# Patient Record
Sex: FEMALE | Race: WHITE | NOT HISPANIC OR LATINO | Employment: OTHER | URBAN - METROPOLITAN AREA
[De-identification: names, ages, dates, MRNs, and addresses within clinical notes are randomized per-mention and may not be internally consistent; named-entity substitution may affect disease eponyms.]

---

## 2021-09-22 ENCOUNTER — TELEPHONE (OUTPATIENT)
Dept: CARDIOLOGY | Facility: CLINIC | Age: 76
End: 2021-09-22

## 2021-10-04 ENCOUNTER — OFFICE VISIT (OUTPATIENT)
Dept: CARDIOLOGY | Facility: CLINIC | Age: 76
End: 2021-10-04
Payer: MEDICARE

## 2021-10-04 VITALS
BODY MASS INDEX: 25.58 KG/M2 | WEIGHT: 163 LBS | HEART RATE: 108 BPM | OXYGEN SATURATION: 98 % | RESPIRATION RATE: 16 BRPM | DIASTOLIC BLOOD PRESSURE: 82 MMHG | SYSTOLIC BLOOD PRESSURE: 142 MMHG | HEIGHT: 67 IN

## 2021-10-04 DIAGNOSIS — I10 HTN, GOAL BELOW 130/80: ICD-10-CM

## 2021-10-04 DIAGNOSIS — L40.50 PSORIATIC ARTHRITIS: ICD-10-CM

## 2021-10-04 DIAGNOSIS — Z85.51 HISTORY OF BLADDER CANCER: ICD-10-CM

## 2021-10-04 DIAGNOSIS — Z79.82 ASPIRIN LONG-TERM USE: ICD-10-CM

## 2021-10-04 DIAGNOSIS — I48.0 PAROXYSMAL ATRIAL FIBRILLATION: Primary | ICD-10-CM

## 2021-10-04 DIAGNOSIS — I47.19 AVNRT (AV NODAL RE-ENTRY TACHYCARDIA): ICD-10-CM

## 2021-10-04 DIAGNOSIS — G47.33 OSA (OBSTRUCTIVE SLEEP APNEA): ICD-10-CM

## 2021-10-04 DIAGNOSIS — I10 ESSENTIAL (PRIMARY) HYPERTENSION: ICD-10-CM

## 2021-10-04 PROCEDURE — 93000 EKG 12-LEAD: ICD-10-PCS | Mod: S$GLB,,, | Performed by: GENERAL PRACTICE

## 2021-10-04 PROCEDURE — 1126F AMNT PAIN NOTED NONE PRSNT: CPT | Mod: CPTII,S$GLB,, | Performed by: GENERAL PRACTICE

## 2021-10-04 PROCEDURE — 1126F PR PAIN SEVERITY QUANTIFIED, NO PAIN PRESENT: ICD-10-PCS | Mod: CPTII,S$GLB,, | Performed by: GENERAL PRACTICE

## 2021-10-04 PROCEDURE — 3288F PR FALLS RISK ASSESSMENT DOCUMENTED: ICD-10-PCS | Mod: CPTII,S$GLB,, | Performed by: GENERAL PRACTICE

## 2021-10-04 PROCEDURE — 1101F PT FALLS ASSESS-DOCD LE1/YR: CPT | Mod: CPTII,S$GLB,, | Performed by: GENERAL PRACTICE

## 2021-10-04 PROCEDURE — 1160F PR REVIEW ALL MEDS BY PRESCRIBER/CLIN PHARMACIST DOCUMENTED: ICD-10-PCS | Mod: CPTII,S$GLB,, | Performed by: GENERAL PRACTICE

## 2021-10-04 PROCEDURE — 1159F PR MEDICATION LIST DOCUMENTED IN MEDICAL RECORD: ICD-10-PCS | Mod: CPTII,S$GLB,, | Performed by: GENERAL PRACTICE

## 2021-10-04 PROCEDURE — 1159F MED LIST DOCD IN RCRD: CPT | Mod: CPTII,S$GLB,, | Performed by: GENERAL PRACTICE

## 2021-10-04 PROCEDURE — 3077F SYST BP >= 140 MM HG: CPT | Mod: CPTII,S$GLB,, | Performed by: GENERAL PRACTICE

## 2021-10-04 PROCEDURE — 3079F PR MOST RECENT DIASTOLIC BLOOD PRESSURE 80-89 MM HG: ICD-10-PCS | Mod: CPTII,S$GLB,, | Performed by: GENERAL PRACTICE

## 2021-10-04 PROCEDURE — 3079F DIAST BP 80-89 MM HG: CPT | Mod: CPTII,S$GLB,, | Performed by: GENERAL PRACTICE

## 2021-10-04 PROCEDURE — 1101F PR PT FALLS ASSESS DOC 0-1 FALLS W/OUT INJ PAST YR: ICD-10-PCS | Mod: CPTII,S$GLB,, | Performed by: GENERAL PRACTICE

## 2021-10-04 PROCEDURE — 99205 PR OFFICE/OUTPT VISIT, NEW, LEVL V, 60-74 MIN: ICD-10-PCS | Mod: S$GLB,,, | Performed by: GENERAL PRACTICE

## 2021-10-04 PROCEDURE — 99205 OFFICE O/P NEW HI 60 MIN: CPT | Mod: S$GLB,,, | Performed by: GENERAL PRACTICE

## 2021-10-04 PROCEDURE — 93000 ELECTROCARDIOGRAM COMPLETE: CPT | Mod: S$GLB,,, | Performed by: GENERAL PRACTICE

## 2021-10-04 PROCEDURE — 1160F RVW MEDS BY RX/DR IN RCRD: CPT | Mod: CPTII,S$GLB,, | Performed by: GENERAL PRACTICE

## 2021-10-04 PROCEDURE — 3077F PR MOST RECENT SYSTOLIC BLOOD PRESSURE >= 140 MM HG: ICD-10-PCS | Mod: CPTII,S$GLB,, | Performed by: GENERAL PRACTICE

## 2021-10-04 PROCEDURE — 3288F FALL RISK ASSESSMENT DOCD: CPT | Mod: CPTII,S$GLB,, | Performed by: GENERAL PRACTICE

## 2021-10-04 RX ORDER — LOSARTAN POTASSIUM 25 MG/1
25 TABLET ORAL DAILY
Qty: 30 TABLET | Refills: 3 | Status: SHIPPED | OUTPATIENT
Start: 2021-10-04

## 2021-10-04 RX ORDER — METOPROLOL TARTRATE 25 MG/1
50 TABLET, FILM COATED ORAL 2 TIMES DAILY
Qty: 60 TABLET | Refills: 3 | Status: SHIPPED | OUTPATIENT
Start: 2021-10-04 | End: 2021-10-18 | Stop reason: SDUPTHER

## 2021-10-04 RX ORDER — FUROSEMIDE 20 MG/1
20 TABLET ORAL 2 TIMES DAILY
Qty: 30 TABLET | Refills: 1 | Status: SHIPPED | OUTPATIENT
Start: 2021-10-04 | End: 2021-10-18 | Stop reason: SDUPTHER

## 2021-10-04 RX ORDER — LOSARTAN POTASSIUM 25 MG/1
25 TABLET ORAL DAILY
Qty: 30 TABLET | Refills: 30 | Status: SHIPPED | OUTPATIENT
Start: 2021-10-04 | End: 2021-10-04 | Stop reason: SDUPTHER

## 2021-10-04 RX ORDER — METOPROLOL TARTRATE 25 MG/1
50 TABLET, FILM COATED ORAL 2 TIMES DAILY
Qty: 60 TABLET | Refills: 3 | Status: SHIPPED | OUTPATIENT
Start: 2021-10-04 | End: 2021-10-04 | Stop reason: SDUPTHER

## 2021-10-04 NOTE — TELEPHONE ENCOUNTER
----- Message from Santiago Ngozi sent at 10/4/2021  3:24 PM CDT -----  Type:  Pharmacy Calling to Clarify an RX    Name of Caller:   Pharmacy Name:[facility] [address], [place] - [address] (Ph: [phone])  Prescription Name: losartan (COZAAR) 25 MG tablet  metoprolol tartrate (LOPRESSOR) 25 MG tablet  What do they need to clarify?: losartan-refill, met-quantity and direction  Best Call Back Number:  Additional Information: please call back and clarify

## 2021-10-07 ENCOUNTER — HOSPITAL ENCOUNTER (OUTPATIENT)
Dept: RADIOLOGY | Facility: HOSPITAL | Age: 76
Discharge: HOME | End: 2021-10-07
Attending: GENERAL PRACTICE
Payer: MEDICARE

## 2021-10-07 DIAGNOSIS — I47.19 AVNRT (AV NODAL RE-ENTRY TACHYCARDIA): ICD-10-CM

## 2021-10-07 DIAGNOSIS — G47.33 OSA (OBSTRUCTIVE SLEEP APNEA): ICD-10-CM

## 2021-10-07 DIAGNOSIS — I10 ESSENTIAL (PRIMARY) HYPERTENSION: ICD-10-CM

## 2021-10-07 DIAGNOSIS — Z85.51 HISTORY OF BLADDER CANCER: ICD-10-CM

## 2021-10-07 DIAGNOSIS — I48.0 PAROXYSMAL ATRIAL FIBRILLATION: ICD-10-CM

## 2021-10-07 DIAGNOSIS — I10 HTN, GOAL BELOW 130/80: ICD-10-CM

## 2021-10-07 DIAGNOSIS — L40.50 PSORIATIC ARTHRITIS: ICD-10-CM

## 2021-10-07 DIAGNOSIS — Z79.82 ASPIRIN LONG-TERM USE: ICD-10-CM

## 2021-10-07 PROCEDURE — 71046 X-RAY EXAM CHEST 2 VIEWS: CPT | Mod: TC,PO

## 2021-10-12 ENCOUNTER — HOSPITAL ENCOUNTER (OUTPATIENT)
Dept: CARDIOLOGY | Facility: CLINIC | Age: 76
Discharge: HOME | End: 2021-10-12
Attending: GENERAL PRACTICE
Payer: MEDICARE

## 2021-10-12 ENCOUNTER — HOSPITAL ENCOUNTER (INPATIENT)
Facility: HOSPITAL | Age: 76
LOS: 1 days | Discharge: LEFT BEFORE DISCHARGE | DRG: 310 | End: 2021-10-13
Attending: EMERGENCY MEDICINE | Admitting: INTERNAL MEDICINE
Payer: MEDICARE

## 2021-10-12 VITALS — BODY MASS INDEX: 25.58 KG/M2 | WEIGHT: 163 LBS | HEIGHT: 67 IN

## 2021-10-12 DIAGNOSIS — R00.0 TACHYCARDIA: ICD-10-CM

## 2021-10-12 DIAGNOSIS — I48.0 PAROXYSMAL ATRIAL FIBRILLATION: ICD-10-CM

## 2021-10-12 DIAGNOSIS — I48.91 ATRIAL FIBRILLATION: ICD-10-CM

## 2021-10-12 DIAGNOSIS — J81.0 ACUTE PULMONARY EDEMA: ICD-10-CM

## 2021-10-12 DIAGNOSIS — I48.91 ATRIAL FIBRILLATION WITH RVR: Primary | ICD-10-CM

## 2021-10-12 PROBLEM — I50.9 ACUTE CHF: Status: ACTIVE | Noted: 2021-10-12

## 2021-10-12 LAB
ALBUMIN SERPL BCP-MCNC: 4 G/DL (ref 3.5–5.2)
ALP SERPL-CCNC: 65 U/L (ref 55–135)
ALT SERPL W/O P-5'-P-CCNC: 27 U/L (ref 10–44)
ANION GAP SERPL CALC-SCNC: 11 MMOL/L (ref 8–16)
AORTIC ROOT ANNULUS: 3 CM
AORTIC VALVE CUSP SEPERATION: 1.1 CM
APTT PPP: 32.5 SEC (ref 23.3–35.1)
AST SERPL-CCNC: 28 U/L (ref 10–40)
AV INDEX (PROSTH): 0.6
AV MEAN GRADIENT: 4 MMHG
AV PEAK GRADIENT: 6 MMHG
AV VALVE AREA: 1.71 CM2
AV VELOCITY RATIO: 0.61
BASOPHILS # BLD AUTO: 0.07 K/UL (ref 0–0.2)
BASOPHILS NFR BLD: 0.9 % (ref 0–1.9)
BILIRUB SERPL-MCNC: 2.7 MG/DL (ref 0.1–1)
BILIRUB UR QL STRIP: NEGATIVE
BNP SERPL-MCNC: 1605 PG/ML (ref 0–99)
BSA FOR ECHO PROCEDURE: 1.87 M2
BUN SERPL-MCNC: 15 MG/DL (ref 8–23)
CALCIUM SERPL-MCNC: 9.2 MG/DL (ref 8.7–10.5)
CHLORIDE SERPL-SCNC: 104 MMOL/L (ref 95–110)
CK MB SERPL-MCNC: 4.1 NG/ML (ref 0.1–6.5)
CK SERPL-CCNC: 70 U/L (ref 20–180)
CLARITY UR: CLEAR
CO2 SERPL-SCNC: 22 MMOL/L (ref 23–29)
COLOR UR: COLORLESS
CREAT SERPL-MCNC: 1 MG/DL (ref 0.5–1.4)
CV ECHO LV RWT: 0.49 CM
DIFFERENTIAL METHOD: ABNORMAL
DOP CALC AO PEAK VEL: 1.21 M/S
DOP CALC AO VTI: 18.6 CM
DOP CALC LVOT AREA: 2.8 CM2
DOP CALC LVOT DIAMETER: 1.9 CM
DOP CALC LVOT PEAK VEL: 0.74 M/S
DOP CALC LVOT STROKE VOLUME: 31.74 CM3
DOP CALCLVOT PEAK VEL VTI: 11.2 CM
E WAVE DECELERATION TIME: 137 MS
E/E' RATIO: 17.09 M/S
ECHO LV POSTERIOR WALL: 1.2 CM (ref 0.6–1.1)
EJECTION FRACTION: 50 %
EOSINOPHIL # BLD AUTO: 0.1 K/UL (ref 0–0.5)
EOSINOPHIL NFR BLD: 0.8 % (ref 0–8)
ERYTHROCYTE [DISTWIDTH] IN BLOOD BY AUTOMATED COUNT: 14.8 % (ref 11.5–14.5)
EST. GFR  (AFRICAN AMERICAN): >60 ML/MIN/1.73 M^2
EST. GFR  (NON AFRICAN AMERICAN): 55.2 ML/MIN/1.73 M^2
FRACTIONAL SHORTENING: 9 % (ref 28–44)
GLUCOSE SERPL-MCNC: 155 MG/DL (ref 70–110)
GLUCOSE UR QL STRIP: NEGATIVE
HCT VFR BLD AUTO: 43.9 % (ref 37–48.5)
HGB BLD-MCNC: 14.1 G/DL (ref 12–16)
HGB UR QL STRIP: NEGATIVE
IMM GRANULOCYTES # BLD AUTO: 0.02 K/UL (ref 0–0.04)
IMM GRANULOCYTES NFR BLD AUTO: 0.3 % (ref 0–0.5)
INR PPP: 1.5
INTERVENTRICULAR SEPTUM: 1.34 CM (ref 0.6–1.1)
IVRT: 58 MS
KETONES UR QL STRIP: NEGATIVE
LA MAJOR: 4.8 CM
LEFT ATRIUM SIZE: 4.8 CM
LEFT INTERNAL DIMENSION IN SYSTOLE: 4.43 CM (ref 2.1–4)
LEFT VENTRICLE MASS INDEX: 131 G/M2
LEFT VENTRICULAR INTERNAL DIMENSION IN DIASTOLE: 4.85 CM (ref 3.5–6)
LEFT VENTRICULAR MASS: 241.46 G
LEUKOCYTE ESTERASE UR QL STRIP: NEGATIVE
LV LATERAL E/E' RATIO: 11.75 M/S
LV SEPTAL E/E' RATIO: 31.33 M/S
LYMPHOCYTES # BLD AUTO: 1.3 K/UL (ref 1–4.8)
LYMPHOCYTES NFR BLD: 18 % (ref 18–48)
MAGNESIUM SERPL-MCNC: 2.1 MG/DL (ref 1.6–2.6)
MCH RBC QN AUTO: 30.7 PG (ref 27–31)
MCHC RBC AUTO-ENTMCNC: 32.1 G/DL (ref 32–36)
MCV RBC AUTO: 95 FL (ref 82–98)
MONOCYTES # BLD AUTO: 0.6 K/UL (ref 0.3–1)
MONOCYTES NFR BLD: 7.8 % (ref 4–15)
MV PEAK E VEL: 0.94 M/S
NEUTROPHILS # BLD AUTO: 5.4 K/UL (ref 1.8–7.7)
NEUTROPHILS NFR BLD: 72.2 % (ref 38–73)
NITRITE UR QL STRIP: NEGATIVE
NRBC BLD-RTO: 0 /100 WBC
PH UR STRIP: 6 [PH] (ref 5–8)
PISA TR MAX VEL: 2.99 M/S
PLATELET # BLD AUTO: 180 K/UL (ref 150–450)
PMV BLD AUTO: 10.3 FL (ref 9.2–12.9)
POTASSIUM SERPL-SCNC: 3.5 MMOL/L (ref 3.5–5.1)
PROT SERPL-MCNC: 6.9 G/DL (ref 6–8.4)
PROT UR QL STRIP: NEGATIVE
PROTHROMBIN TIME: 17.2 SEC (ref 11.4–13.7)
RA PRESSURE: 3 MMHG
RBC # BLD AUTO: 4.6 M/UL (ref 4–5.4)
RIGHT VENTRICULAR END-DIASTOLIC DIMENSION: 3.25 CM
SARS-COV-2 RDRP RESP QL NAA+PROBE: NEGATIVE
SODIUM SERPL-SCNC: 137 MMOL/L (ref 136–145)
SP GR UR STRIP: 1 (ref 1–1.03)
TDI LATERAL: 0.08 M/S
TDI SEPTAL: 0.03 M/S
TDI: 0.06 M/S
TR MAX PG: 36 MMHG
TROPONIN I SERPL DL<=0.01 NG/ML-MCNC: 0.04 NG/ML
TV REST PULMONARY ARTERY PRESSURE: 39 MMHG
URN SPEC COLLECT METH UR: ABNORMAL
UROBILINOGEN UR STRIP-ACNC: NEGATIVE EU/DL
WBC # BLD AUTO: 7.45 K/UL (ref 3.9–12.7)

## 2021-10-12 PROCEDURE — 93005 ELECTROCARDIOGRAM TRACING: CPT | Performed by: INTERNAL MEDICINE

## 2021-10-12 PROCEDURE — 83735 ASSAY OF MAGNESIUM: CPT | Performed by: EMERGENCY MEDICINE

## 2021-10-12 PROCEDURE — 99291 CRITICAL CARE FIRST HOUR: CPT

## 2021-10-12 PROCEDURE — 94761 N-INVAS EAR/PLS OXIMETRY MLT: CPT

## 2021-10-12 PROCEDURE — 81003 URINALYSIS AUTO W/O SCOPE: CPT | Performed by: EMERGENCY MEDICINE

## 2021-10-12 PROCEDURE — 85025 COMPLETE CBC W/AUTO DIFF WBC: CPT | Performed by: EMERGENCY MEDICINE

## 2021-10-12 PROCEDURE — 99900035 HC TECH TIME PER 15 MIN (STAT)

## 2021-10-12 PROCEDURE — 36415 COLL VENOUS BLD VENIPUNCTURE: CPT | Performed by: EMERGENCY MEDICINE

## 2021-10-12 PROCEDURE — 82553 CREATINE MB FRACTION: CPT | Performed by: EMERGENCY MEDICINE

## 2021-10-12 PROCEDURE — 83880 ASSAY OF NATRIURETIC PEPTIDE: CPT | Performed by: EMERGENCY MEDICINE

## 2021-10-12 PROCEDURE — 96366 THER/PROPH/DIAG IV INF ADDON: CPT

## 2021-10-12 PROCEDURE — 93010 EKG 12-LEAD: ICD-10-PCS | Mod: ,,, | Performed by: INTERNAL MEDICINE

## 2021-10-12 PROCEDURE — 82550 ASSAY OF CK (CPK): CPT | Performed by: EMERGENCY MEDICINE

## 2021-10-12 PROCEDURE — U0002 COVID-19 LAB TEST NON-CDC: HCPCS | Performed by: EMERGENCY MEDICINE

## 2021-10-12 PROCEDURE — 84484 ASSAY OF TROPONIN QUANT: CPT | Performed by: EMERGENCY MEDICINE

## 2021-10-12 PROCEDURE — 85730 THROMBOPLASTIN TIME PARTIAL: CPT | Performed by: EMERGENCY MEDICINE

## 2021-10-12 PROCEDURE — 21000000 HC CCU ICU ROOM CHARGE

## 2021-10-12 PROCEDURE — 96375 TX/PRO/DX INJ NEW DRUG ADDON: CPT

## 2021-10-12 PROCEDURE — 63600175 PHARM REV CODE 636 W HCPCS: Performed by: EMERGENCY MEDICINE

## 2021-10-12 PROCEDURE — 25000003 PHARM REV CODE 250

## 2021-10-12 PROCEDURE — 25000003 PHARM REV CODE 250: Performed by: EMERGENCY MEDICINE

## 2021-10-12 PROCEDURE — 80053 COMPREHEN METABOLIC PANEL: CPT | Performed by: EMERGENCY MEDICINE

## 2021-10-12 PROCEDURE — 96365 THER/PROPH/DIAG IV INF INIT: CPT

## 2021-10-12 PROCEDURE — 25000003 PHARM REV CODE 250: Performed by: INTERNAL MEDICINE

## 2021-10-12 PROCEDURE — 85610 PROTHROMBIN TIME: CPT | Performed by: EMERGENCY MEDICINE

## 2021-10-12 PROCEDURE — 93306 TTE W/DOPPLER COMPLETE: CPT | Mod: S$GLB,,, | Performed by: GENERAL PRACTICE

## 2021-10-12 PROCEDURE — 93010 ELECTROCARDIOGRAM REPORT: CPT | Mod: ,,, | Performed by: INTERNAL MEDICINE

## 2021-10-12 PROCEDURE — 93306 ECHO (CUPID ONLY): ICD-10-PCS | Mod: S$GLB,,, | Performed by: GENERAL PRACTICE

## 2021-10-12 RX ORDER — FUROSEMIDE 10 MG/ML
40 INJECTION INTRAMUSCULAR; INTRAVENOUS
Status: DISCONTINUED | OUTPATIENT
Start: 2021-10-13 | End: 2021-10-13 | Stop reason: HOSPADM

## 2021-10-12 RX ORDER — MAGNESIUM SULFATE HEPTAHYDRATE 40 MG/ML
4 INJECTION, SOLUTION INTRAVENOUS
Status: DISCONTINUED | OUTPATIENT
Start: 2021-10-12 | End: 2021-10-13 | Stop reason: HOSPADM

## 2021-10-12 RX ORDER — POTASSIUM CHLORIDE 20 MEQ/1
40 TABLET, EXTENDED RELEASE ORAL ONCE
Status: COMPLETED | OUTPATIENT
Start: 2021-10-12 | End: 2021-10-12

## 2021-10-12 RX ORDER — POTASSIUM CHLORIDE 7.45 MG/ML
40 INJECTION INTRAVENOUS
Status: DISCONTINUED | OUTPATIENT
Start: 2021-10-12 | End: 2021-10-13 | Stop reason: HOSPADM

## 2021-10-12 RX ORDER — POTASSIUM CHLORIDE 20 MEQ/1
20 TABLET, EXTENDED RELEASE ORAL
Status: DISCONTINUED | OUTPATIENT
Start: 2021-10-12 | End: 2021-10-13 | Stop reason: HOSPADM

## 2021-10-12 RX ORDER — MAGNESIUM SULFATE HEPTAHYDRATE 40 MG/ML
2 INJECTION, SOLUTION INTRAVENOUS
Status: DISCONTINUED | OUTPATIENT
Start: 2021-10-12 | End: 2021-10-13 | Stop reason: HOSPADM

## 2021-10-12 RX ORDER — POTASSIUM CHLORIDE 20 MEQ/1
40 TABLET, EXTENDED RELEASE ORAL
Status: DISCONTINUED | OUTPATIENT
Start: 2021-10-12 | End: 2021-10-13 | Stop reason: HOSPADM

## 2021-10-12 RX ORDER — DILTIAZEM HYDROCHLORIDE 5 MG/ML
INJECTION INTRAVENOUS
Status: DISPENSED
Start: 2021-10-12 | End: 2021-10-13

## 2021-10-12 RX ORDER — FUROSEMIDE 10 MG/ML
20 INJECTION INTRAMUSCULAR; INTRAVENOUS
Status: COMPLETED | OUTPATIENT
Start: 2021-10-12 | End: 2021-10-12

## 2021-10-12 RX ORDER — POTASSIUM CHLORIDE 7.45 MG/ML
20 INJECTION INTRAVENOUS
Status: DISCONTINUED | OUTPATIENT
Start: 2021-10-12 | End: 2021-10-13 | Stop reason: HOSPADM

## 2021-10-12 RX ORDER — LANOLIN ALCOHOL/MO/W.PET/CERES
800 CREAM (GRAM) TOPICAL
Status: DISCONTINUED | OUTPATIENT
Start: 2021-10-12 | End: 2021-10-13 | Stop reason: HOSPADM

## 2021-10-12 RX ORDER — FUROSEMIDE 10 MG/ML
40 INJECTION INTRAMUSCULAR; INTRAVENOUS
Status: DISCONTINUED | OUTPATIENT
Start: 2021-10-13 | End: 2021-10-12

## 2021-10-12 RX ORDER — DILTIAZEM HYDROCHLORIDE 5 MG/ML
10 INJECTION INTRAVENOUS
Status: COMPLETED | OUTPATIENT
Start: 2021-10-12 | End: 2021-10-12

## 2021-10-12 RX ORDER — MAGNESIUM SULFATE 1 G/100ML
1 INJECTION INTRAVENOUS
Status: DISCONTINUED | OUTPATIENT
Start: 2021-10-12 | End: 2021-10-13 | Stop reason: HOSPADM

## 2021-10-12 RX ORDER — ASPIRIN 325 MG
650 TABLET ORAL DAILY
COMMUNITY

## 2021-10-12 RX ORDER — FUROSEMIDE 10 MG/ML
40 INJECTION INTRAMUSCULAR; INTRAVENOUS DAILY
Status: DISCONTINUED | OUTPATIENT
Start: 2021-10-12 | End: 2021-10-12

## 2021-10-12 RX ORDER — PANTOPRAZOLE SODIUM 40 MG/1
40 TABLET, DELAYED RELEASE ORAL DAILY
Status: DISCONTINUED | OUTPATIENT
Start: 2021-10-12 | End: 2021-10-13 | Stop reason: HOSPADM

## 2021-10-12 RX ORDER — ACETAMINOPHEN 325 MG/1
650 TABLET ORAL EVERY 4 HOURS PRN
Status: DISCONTINUED | OUTPATIENT
Start: 2021-10-12 | End: 2021-10-13 | Stop reason: HOSPADM

## 2021-10-12 RX ORDER — NALOXONE HCL 0.4 MG/ML
0.02 VIAL (ML) INJECTION
Status: DISCONTINUED | OUTPATIENT
Start: 2021-10-12 | End: 2021-10-13 | Stop reason: HOSPADM

## 2021-10-12 RX ORDER — METOPROLOL TARTRATE 50 MG/1
50 TABLET ORAL 2 TIMES DAILY
Status: DISCONTINUED | OUTPATIENT
Start: 2021-10-12 | End: 2021-10-13 | Stop reason: HOSPADM

## 2021-10-12 RX ADMIN — APIXABAN 5 MG: 5 TABLET, FILM COATED ORAL at 08:10

## 2021-10-12 RX ADMIN — FUROSEMIDE 20 MG: 10 INJECTION, SOLUTION INTRAMUSCULAR; INTRAVENOUS at 02:10

## 2021-10-12 RX ADMIN — DEXTROSE MONOHYDRATE 5 MG/HR: 50 INJECTION, SOLUTION INTRAVENOUS at 12:10

## 2021-10-12 RX ADMIN — DILTIAZEM HYDROCHLORIDE 10 MG: 5 INJECTION INTRAVENOUS at 12:10

## 2021-10-12 RX ADMIN — POTASSIUM CHLORIDE 40 MEQ: 20 TABLET, EXTENDED RELEASE ORAL at 04:10

## 2021-10-12 RX ADMIN — METOPROLOL TARTRATE 50 MG: 50 TABLET, FILM COATED ORAL at 08:10

## 2021-10-13 VITALS
BODY MASS INDEX: 30.22 KG/M2 | RESPIRATION RATE: 22 BRPM | DIASTOLIC BLOOD PRESSURE: 83 MMHG | SYSTOLIC BLOOD PRESSURE: 137 MMHG | HEART RATE: 77 BPM | WEIGHT: 164.25 LBS | HEIGHT: 62 IN | TEMPERATURE: 97 F | OXYGEN SATURATION: 92 %

## 2021-10-13 LAB
ALBUMIN SERPL BCP-MCNC: 3.6 G/DL (ref 3.5–5.2)
ALP SERPL-CCNC: 60 U/L (ref 55–135)
ALT SERPL W/O P-5'-P-CCNC: 24 U/L (ref 10–44)
ANION GAP SERPL CALC-SCNC: 10 MMOL/L (ref 8–16)
AST SERPL-CCNC: 26 U/L (ref 10–40)
BILIRUB SERPL-MCNC: 2.8 MG/DL (ref 0.1–1)
BUN SERPL-MCNC: 17 MG/DL (ref 8–23)
CALCIUM SERPL-MCNC: 9 MG/DL (ref 8.7–10.5)
CHLORIDE SERPL-SCNC: 107 MMOL/L (ref 95–110)
CO2 SERPL-SCNC: 24 MMOL/L (ref 23–29)
CREAT SERPL-MCNC: 1 MG/DL (ref 0.5–1.4)
ERYTHROCYTE [DISTWIDTH] IN BLOOD BY AUTOMATED COUNT: 14.8 % (ref 11.5–14.5)
EST. GFR  (AFRICAN AMERICAN): >60 ML/MIN/1.73 M^2
EST. GFR  (NON AFRICAN AMERICAN): 55.2 ML/MIN/1.73 M^2
GLUCOSE SERPL-MCNC: 131 MG/DL (ref 70–110)
HCT VFR BLD AUTO: 42.3 % (ref 37–48.5)
HGB BLD-MCNC: 13.3 G/DL (ref 12–16)
MAGNESIUM SERPL-MCNC: 2 MG/DL (ref 1.6–2.6)
MCH RBC QN AUTO: 30.7 PG (ref 27–31)
MCHC RBC AUTO-ENTMCNC: 31.4 G/DL (ref 32–36)
MCV RBC AUTO: 98 FL (ref 82–98)
PLATELET # BLD AUTO: 169 K/UL (ref 150–450)
PMV BLD AUTO: 10.3 FL (ref 9.2–12.9)
POTASSIUM SERPL-SCNC: 3.7 MMOL/L (ref 3.5–5.1)
PROT SERPL-MCNC: 6.3 G/DL (ref 6–8.4)
RBC # BLD AUTO: 4.33 M/UL (ref 4–5.4)
SODIUM SERPL-SCNC: 141 MMOL/L (ref 136–145)
WBC # BLD AUTO: 6.95 K/UL (ref 3.9–12.7)

## 2021-10-13 PROCEDURE — 80053 COMPREHEN METABOLIC PANEL: CPT | Performed by: INTERNAL MEDICINE

## 2021-10-13 PROCEDURE — 99900035 HC TECH TIME PER 15 MIN (STAT)

## 2021-10-13 PROCEDURE — 85027 COMPLETE CBC AUTOMATED: CPT | Performed by: INTERNAL MEDICINE

## 2021-10-13 PROCEDURE — 94761 N-INVAS EAR/PLS OXIMETRY MLT: CPT

## 2021-10-13 PROCEDURE — 36415 COLL VENOUS BLD VENIPUNCTURE: CPT | Performed by: INTERNAL MEDICINE

## 2021-10-13 PROCEDURE — 83735 ASSAY OF MAGNESIUM: CPT | Performed by: INTERNAL MEDICINE

## 2021-10-13 PROCEDURE — 25000003 PHARM REV CODE 250: Performed by: INTERNAL MEDICINE

## 2021-10-13 PROCEDURE — 63600175 PHARM REV CODE 636 W HCPCS: Performed by: INTERNAL MEDICINE

## 2021-10-13 RX ORDER — DILTIAZEM HYDROCHLORIDE 120 MG/1
120 CAPSULE, COATED, EXTENDED RELEASE ORAL DAILY
Status: DISCONTINUED | OUTPATIENT
Start: 2021-10-13 | End: 2021-10-13

## 2021-10-13 RX ORDER — DILTIAZEM HYDROCHLORIDE 60 MG/1
60 TABLET, FILM COATED ORAL EVERY 8 HOURS
Status: DISCONTINUED | OUTPATIENT
Start: 2021-10-13 | End: 2021-10-13

## 2021-10-13 RX ORDER — DILTIAZEM HYDROCHLORIDE 120 MG/1
120 CAPSULE, COATED, EXTENDED RELEASE ORAL DAILY
Status: DISCONTINUED | OUTPATIENT
Start: 2021-10-13 | End: 2021-10-13 | Stop reason: HOSPADM

## 2021-10-13 RX ADMIN — PANTOPRAZOLE SODIUM 40 MG: 40 TABLET, DELAYED RELEASE ORAL at 05:10

## 2021-10-13 RX ADMIN — FUROSEMIDE 40 MG: 10 INJECTION, SOLUTION INTRAMUSCULAR; INTRAVENOUS at 05:10

## 2021-10-13 RX ADMIN — DEXTROSE MONOHYDRATE 5 MG/HR: 50 INJECTION, SOLUTION INTRAVENOUS at 06:10

## 2021-10-18 ENCOUNTER — OFFICE VISIT (OUTPATIENT)
Dept: CARDIOLOGY | Facility: CLINIC | Age: 76
End: 2021-10-18
Payer: MEDICARE

## 2021-10-18 VITALS
DIASTOLIC BLOOD PRESSURE: 102 MMHG | WEIGHT: 160 LBS | OXYGEN SATURATION: 97 % | HEIGHT: 62 IN | SYSTOLIC BLOOD PRESSURE: 154 MMHG | HEART RATE: 86 BPM | BODY MASS INDEX: 29.44 KG/M2

## 2021-10-18 DIAGNOSIS — R73.09 ABNORMAL GLUCOSE: ICD-10-CM

## 2021-10-18 DIAGNOSIS — I47.19 AVNRT (AV NODAL RE-ENTRY TACHYCARDIA): ICD-10-CM

## 2021-10-18 DIAGNOSIS — I50.41 ACUTE COMBINED SYSTOLIC AND DIASTOLIC CONGESTIVE HEART FAILURE: ICD-10-CM

## 2021-10-18 DIAGNOSIS — Z85.51 HISTORY OF BLADDER CANCER: ICD-10-CM

## 2021-10-18 DIAGNOSIS — G47.33 OSA (OBSTRUCTIVE SLEEP APNEA): ICD-10-CM

## 2021-10-18 DIAGNOSIS — I10 ESSENTIAL (PRIMARY) HYPERTENSION: ICD-10-CM

## 2021-10-18 DIAGNOSIS — I48.91 ATRIAL FIBRILLATION WITH RVR: Primary | ICD-10-CM

## 2021-10-18 DIAGNOSIS — I10 HTN, GOAL BELOW 130/80: ICD-10-CM

## 2021-10-18 DIAGNOSIS — N18.31 STAGE 3A CHRONIC KIDNEY DISEASE: ICD-10-CM

## 2021-10-18 DIAGNOSIS — I21.09 ACUTE MYOCARDIAL INFARCTION OF ANTEROLATERAL WALL: ICD-10-CM

## 2021-10-18 DIAGNOSIS — Z79.82 ASPIRIN LONG-TERM USE: ICD-10-CM

## 2021-10-18 PROCEDURE — 1101F PT FALLS ASSESS-DOCD LE1/YR: CPT | Mod: CPTII,S$GLB,, | Performed by: GENERAL PRACTICE

## 2021-10-18 PROCEDURE — 99214 PR OFFICE/OUTPT VISIT, EST, LEVL IV, 30-39 MIN: ICD-10-PCS | Mod: S$GLB,,, | Performed by: GENERAL PRACTICE

## 2021-10-18 PROCEDURE — 1159F PR MEDICATION LIST DOCUMENTED IN MEDICAL RECORD: ICD-10-PCS | Mod: CPTII,S$GLB,, | Performed by: GENERAL PRACTICE

## 2021-10-18 PROCEDURE — 1126F AMNT PAIN NOTED NONE PRSNT: CPT | Mod: CPTII,S$GLB,, | Performed by: GENERAL PRACTICE

## 2021-10-18 PROCEDURE — 93000 ELECTROCARDIOGRAM COMPLETE: CPT | Mod: S$GLB,,, | Performed by: GENERAL PRACTICE

## 2021-10-18 PROCEDURE — 1101F PR PT FALLS ASSESS DOC 0-1 FALLS W/OUT INJ PAST YR: ICD-10-PCS | Mod: CPTII,S$GLB,, | Performed by: GENERAL PRACTICE

## 2021-10-18 PROCEDURE — 1111F DSCHRG MED/CURRENT MED MERGE: CPT | Mod: CPTII,S$GLB,, | Performed by: GENERAL PRACTICE

## 2021-10-18 PROCEDURE — 3080F DIAST BP >= 90 MM HG: CPT | Mod: CPTII,S$GLB,, | Performed by: GENERAL PRACTICE

## 2021-10-18 PROCEDURE — 3077F SYST BP >= 140 MM HG: CPT | Mod: CPTII,S$GLB,, | Performed by: GENERAL PRACTICE

## 2021-10-18 PROCEDURE — 1111F PR DISCHARGE MEDS RECONCILED W/ CURRENT OUTPATIENT MED LIST: ICD-10-PCS | Mod: CPTII,S$GLB,, | Performed by: GENERAL PRACTICE

## 2021-10-18 PROCEDURE — 3077F PR MOST RECENT SYSTOLIC BLOOD PRESSURE >= 140 MM HG: ICD-10-PCS | Mod: CPTII,S$GLB,, | Performed by: GENERAL PRACTICE

## 2021-10-18 PROCEDURE — 93000 EKG 12-LEAD: ICD-10-PCS | Mod: S$GLB,,, | Performed by: GENERAL PRACTICE

## 2021-10-18 PROCEDURE — 1126F PR PAIN SEVERITY QUANTIFIED, NO PAIN PRESENT: ICD-10-PCS | Mod: CPTII,S$GLB,, | Performed by: GENERAL PRACTICE

## 2021-10-18 PROCEDURE — 1160F RVW MEDS BY RX/DR IN RCRD: CPT | Mod: CPTII,S$GLB,, | Performed by: GENERAL PRACTICE

## 2021-10-18 PROCEDURE — 1159F MED LIST DOCD IN RCRD: CPT | Mod: CPTII,S$GLB,, | Performed by: GENERAL PRACTICE

## 2021-10-18 PROCEDURE — 3288F PR FALLS RISK ASSESSMENT DOCUMENTED: ICD-10-PCS | Mod: CPTII,S$GLB,, | Performed by: GENERAL PRACTICE

## 2021-10-18 PROCEDURE — 3288F FALL RISK ASSESSMENT DOCD: CPT | Mod: CPTII,S$GLB,, | Performed by: GENERAL PRACTICE

## 2021-10-18 PROCEDURE — 1160F PR REVIEW ALL MEDS BY PRESCRIBER/CLIN PHARMACIST DOCUMENTED: ICD-10-PCS | Mod: CPTII,S$GLB,, | Performed by: GENERAL PRACTICE

## 2021-10-18 PROCEDURE — 3080F PR MOST RECENT DIASTOLIC BLOOD PRESSURE >= 90 MM HG: ICD-10-PCS | Mod: CPTII,S$GLB,, | Performed by: GENERAL PRACTICE

## 2021-10-18 PROCEDURE — 99214 OFFICE O/P EST MOD 30 MIN: CPT | Mod: S$GLB,,, | Performed by: GENERAL PRACTICE

## 2021-10-18 RX ORDER — METOPROLOL TARTRATE 50 MG/1
50 TABLET ORAL 2 TIMES DAILY
Qty: 180 TABLET | Refills: 3 | Status: SHIPPED | OUTPATIENT
Start: 2021-10-18

## 2021-10-18 RX ORDER — DILTIAZEM HYDROCHLORIDE 120 MG/1
120 CAPSULE, EXTENDED RELEASE ORAL DAILY
Qty: 90 CAPSULE | Refills: 3 | Status: SHIPPED | OUTPATIENT
Start: 2021-10-18 | End: 2021-10-19

## 2021-10-18 RX ORDER — FUROSEMIDE 20 MG/1
20 TABLET ORAL DAILY
Qty: 90 TABLET | Refills: 3 | Status: SHIPPED | OUTPATIENT
Start: 2021-10-18 | End: 2022-03-21 | Stop reason: SDUPTHER

## 2021-12-06 ENCOUNTER — TELEPHONE (OUTPATIENT)
Dept: CARDIOLOGY | Facility: CLINIC | Age: 76
End: 2021-12-06
Payer: MEDICARE

## 2021-12-06 ENCOUNTER — TELEPHONE (OUTPATIENT)
Dept: FAMILY MEDICINE | Facility: CLINIC | Age: 76
End: 2021-12-06
Payer: MEDICARE

## 2021-12-06 NOTE — TELEPHONE ENCOUNTER
----- Message from John Haney MD sent at 12/1/2021  5:20 PM CST -----    ----- Message -----  From: SYSTEM  Sent: 11/11/2021   1:20 AM CST  To: John Haney MD

## 2022-02-14 ENCOUNTER — OFFICE VISIT (OUTPATIENT)
Dept: CARDIOLOGY | Facility: CLINIC | Age: 77
End: 2022-02-14
Payer: MEDICARE

## 2022-02-14 VITALS
BODY MASS INDEX: 29.44 KG/M2 | OXYGEN SATURATION: 92 % | WEIGHT: 160 LBS | HEIGHT: 62 IN | RESPIRATION RATE: 16 BRPM | DIASTOLIC BLOOD PRESSURE: 72 MMHG | SYSTOLIC BLOOD PRESSURE: 122 MMHG | HEART RATE: 119 BPM

## 2022-02-14 DIAGNOSIS — R79.9 ABNORMAL FINDING OF BLOOD CHEMISTRY, UNSPECIFIED: ICD-10-CM

## 2022-02-14 DIAGNOSIS — G47.09 OTHER INSOMNIA: ICD-10-CM

## 2022-02-14 DIAGNOSIS — I48.91 ATRIAL FIBRILLATION WITH RVR: Primary | ICD-10-CM

## 2022-02-14 DIAGNOSIS — R73.09 ABNORMAL GLUCOSE: ICD-10-CM

## 2022-02-14 PROCEDURE — 1101F PR PT FALLS ASSESS DOC 0-1 FALLS W/OUT INJ PAST YR: ICD-10-PCS | Mod: CPTII,S$GLB,, | Performed by: GENERAL PRACTICE

## 2022-02-14 PROCEDURE — 93000 EKG 12-LEAD: ICD-10-PCS | Mod: S$GLB,,, | Performed by: GENERAL PRACTICE

## 2022-02-14 PROCEDURE — 93000 ELECTROCARDIOGRAM COMPLETE: CPT | Mod: S$GLB,,, | Performed by: GENERAL PRACTICE

## 2022-02-14 PROCEDURE — 3078F PR MOST RECENT DIASTOLIC BLOOD PRESSURE < 80 MM HG: ICD-10-PCS | Mod: CPTII,S$GLB,, | Performed by: GENERAL PRACTICE

## 2022-02-14 PROCEDURE — 1159F PR MEDICATION LIST DOCUMENTED IN MEDICAL RECORD: ICD-10-PCS | Mod: CPTII,S$GLB,, | Performed by: GENERAL PRACTICE

## 2022-02-14 PROCEDURE — 99212 OFFICE O/P EST SF 10 MIN: CPT | Mod: S$GLB,,, | Performed by: GENERAL PRACTICE

## 2022-02-14 PROCEDURE — 3074F SYST BP LT 130 MM HG: CPT | Mod: CPTII,S$GLB,, | Performed by: GENERAL PRACTICE

## 2022-02-14 PROCEDURE — 1159F MED LIST DOCD IN RCRD: CPT | Mod: CPTII,S$GLB,, | Performed by: GENERAL PRACTICE

## 2022-02-14 PROCEDURE — 3288F PR FALLS RISK ASSESSMENT DOCUMENTED: ICD-10-PCS | Mod: CPTII,S$GLB,, | Performed by: GENERAL PRACTICE

## 2022-02-14 PROCEDURE — 3078F DIAST BP <80 MM HG: CPT | Mod: CPTII,S$GLB,, | Performed by: GENERAL PRACTICE

## 2022-02-14 PROCEDURE — 3288F FALL RISK ASSESSMENT DOCD: CPT | Mod: CPTII,S$GLB,, | Performed by: GENERAL PRACTICE

## 2022-02-14 PROCEDURE — 1160F RVW MEDS BY RX/DR IN RCRD: CPT | Mod: CPTII,S$GLB,, | Performed by: GENERAL PRACTICE

## 2022-02-14 PROCEDURE — 99212 PR OFFICE/OUTPT VISIT, EST, LEVL II, 10-19 MIN: ICD-10-PCS | Mod: S$GLB,,, | Performed by: GENERAL PRACTICE

## 2022-02-14 PROCEDURE — 1126F AMNT PAIN NOTED NONE PRSNT: CPT | Mod: CPTII,S$GLB,, | Performed by: GENERAL PRACTICE

## 2022-02-14 PROCEDURE — 3074F PR MOST RECENT SYSTOLIC BLOOD PRESSURE < 130 MM HG: ICD-10-PCS | Mod: CPTII,S$GLB,, | Performed by: GENERAL PRACTICE

## 2022-02-14 PROCEDURE — 1126F PR PAIN SEVERITY QUANTIFIED, NO PAIN PRESENT: ICD-10-PCS | Mod: CPTII,S$GLB,, | Performed by: GENERAL PRACTICE

## 2022-02-14 PROCEDURE — 1101F PT FALLS ASSESS-DOCD LE1/YR: CPT | Mod: CPTII,S$GLB,, | Performed by: GENERAL PRACTICE

## 2022-02-14 PROCEDURE — 1160F PR REVIEW ALL MEDS BY PRESCRIBER/CLIN PHARMACIST DOCUMENTED: ICD-10-PCS | Mod: CPTII,S$GLB,, | Performed by: GENERAL PRACTICE

## 2022-02-14 RX ORDER — TRAZODONE HYDROCHLORIDE 50 MG/1
50 TABLET ORAL NIGHTLY PRN
Qty: 30 TABLET | Refills: 1 | Status: SHIPPED | OUTPATIENT
Start: 2022-02-14 | End: 2022-03-16

## 2022-02-14 NOTE — PROGRESS NOTES
Subjective:    Patient ID:  Linnea Turner is a 76 y.o. female who presents for follow-up of   Chief Complaint   Patient presents with    Atrial Fibrillation       HPI:  She has a previous history of AVRNT with ablation by Dr. Sweeney 5 years ago, hypertension, bladder cancer which is treated with local treatment and currently in remission, and psoriatic arthritis.  She was lost to follow-up in the past when she presented to [facility] with recalcitrant AVR and T and required an ablation.  The ablation was successful and she followed with Dr. Sweeney for awhile to the COVID pandemic.  After this she was afraid to come to the hospital physician's office so she did not have her diltiazem and losartan refilled.  She does take to aspirin 325 mg daily for psoriatic arthritis in her head shoulder and knees.  Other anti-inflammatories upset her stomach  .  Approximately 5 weeks ago she started to have irregular heartbeat and swelling in her lower extremities and feet she felt like her heart was beating erratically.  She does not have any chest pain shortness of breath.  No syncope.  She sleeps less than 5 hours a day, may have sleep apnea.  She does not wake up short of breath at night.      Hospital Course:   Patient admitted with A Fib RVR and associated acute heart failure  Pt recently established care with Cardiologist(Dr Haney) as an Outpatient   She picked NOAC from pharmacy but not beta blockers which lead to this admission   Upon this admission pt was started on iv lasix/iv cardizem gtt  Plan was to have Cardioversion during this admission   Pt unfortunately left AMA      Atrial fibrillation with RVR    2. AVNRT (AV ruth re-entry tachycardia)    3. HTN, goal below 130/80    4. SHYLA (obstructive sleep apnea)    5. Essential (primary) hypertension     6. History of bladder cancer    7. Aspirin long-term use    8. Acute combined systolic and diastolic congestive heart failure    9. Acute myocardial infarction of  anterolateral wall    10. Abnormal glucose    11. Stage 3a chronic kidney disease       Plan:           Problem List Items Addressed This Visit            Cardiac/Vascular      Atrial fibrillation with RVR - Primary      Relevant Orders      EKG 12-lead      Acute CHF                Other Visit Diagnoses      AVNRT (AV ruth re-entry tachycardia)         HTN, goal below 130/80         SHYLA (obstructive sleep apnea)         Essential (primary) hypertension          History of bladder cancer         Aspirin long-term use         Acute myocardial infarction of anterolateral wall         Abnormal glucose         Stage 3a chronic kidney disease              She is currently doing fine I did increase her I medications do include diltiazem 120 once a day to decrease her blood pressure and her heart rate.  Her medications were renewed for 3 months at a time through [facility].  Will continue heart rate control and anticoagulation.  Will reduce the Lasix to 20 mg once a day which should be adequate the heart failure appears to have cleared up now that the heart rate is better control.  Doesn't want treatment for MI or glucose     Glucose was elevated so will check a1c next visit no added sweets diet.     2/14/22      SHE IS HERE FOR ROUTINE FOLLOW-UP.  She has no complaints except insomnia.  She does not have a family physician since [facility].  Her heart rate has been well controlled and she has no edema.  No shortness of breath..        Review of patient's allergies indicates:  No Known Allergies    Past Medical History:   Diagnosis Date    Atrial fibrillation     Psoriatic arthritis      Past Surgical History:   Procedure Laterality Date    NO PAST SURGERIES       Social History     Tobacco Use    Smoking status: Former Smoker    Smokeless tobacco: Never Used   Substance Use Topics    Alcohol use: Yes    Drug use: Never     Family History   Problem Relation Age of Onset    Hypertension Mother         Review of Systems:    Constitution: Negative for diaphoresis and fever.   HEENT: Negative for nosebleeds.    Cardiovascular: Negative for chest pain       No dyspnea on exertion       No leg swelling        No palpitations  Respiratory: Negative for shortness of breath and wheezing.    Hematologic/Lymphatic: Negative for bleeding problem. Does not bruise/bleed easily.   Skin: Negative for color change and rash.   Musculoskeletal: Negative for falls and myalgias.   Gastrointestinal: Negative for hematemesis and hematochezia.   Genitourinary: Negative for hematuria.   Neurological: Negative for dizziness and light-headedness.   Psychiatric/Behavioral: Negative for altered mental status and memory loss.          Objective:        Vitals:    02/14/22 1121   BP: 122/72   Pulse: (!) 119   Resp: 16       Lab Results   Component Value Date    WBC 6.95 10/13/2021    HGB 13.3 10/13/2021    HCT 42.3 10/13/2021     10/13/2021    ALT 24 10/13/2021    AST 26 10/13/2021     10/13/2021    K 3.7 10/13/2021     10/13/2021    CREATININE 1.0 10/13/2021    BUN 17 10/13/2021    CO2 24 10/13/2021    INR 1.5 10/12/2021        ECHOCARDIOGRAM RESULTS  Results for orders placed during the hospital encounter of 10/12/21    Echo Saline Bubble? No    Interpretation Summary  · The left ventricle is normal in size with mild concentric hypertrophy and low normal systolic function.  · The estimated PA systolic pressure is 39 mmHg.  · Normal right ventricular size with normal right ventricular systolic function.  · Normal central venous pressure (3 mmHg).  · Moderate left atrial enlargement.  · The estimated ejection fraction is 50%.  · Mild-to-moderate mitral regurgitation.  · Mild right atrial enlargement.  · Atrial fibrillation observed.  · Mild to moderate tricuspid regurgitation.  · There is mild pulmonary hypertension.        CURRENT/PREVIOUS VISIT EKG  Results for orders placed or performed in visit on 10/18/21   EKG 12-lead    Collection Time:  10/18/21  1:34 PM    Narrative    Test Reason :     Vent. Rate : 103 BPM     Atrial Rate : 129 BPM     P-R Int : 000 ms          QRS Dur : 094 ms      QT Int : 362 ms       P-R-T Axes : 000 128 006 degrees     QTc Int : 474 ms    Atrial fibrillation with rapid ventricular response  Lateral infarct ,age undetermined  Abnormal ECG  When compared with ECG of 12-OCT-2021 12:08,  Vent. rate has decreased BY  55 BPM  Questionable change in The axis  Lateral infarct is now Present  Nonspecific T wave abnormality now evident in Inferior leads    Referred By: [email or web address]   SELF           Confirmed By:      No valid procedures specified.   No results found for this or any previous visit.      Physical Exam:  CONSTITUTIONAL: No fever, no chills  HEENT: Normocephalic, atraumatic,pupils reactive to light                 NECK:  No JVD no carotid bruit  CVS: S1S2+, irregular regular heart rate 86  LUNGS: Clear  ABDOMEN: Soft, NT, BS+  EXTREMITIES: No cyanosis, edema  : No barton catheter  NEURO: AAO X 3  PSY: Normal affect      Medication List with Changes/Refills   New Medications    TRAZODONE (DESYREL) 50 MG TABLET    Take 1 tablet (50 mg total) by mouth nightly as needed for Insomnia.   Current Medications    APIXABAN (ELIQUIS) 5 MG TAB    Take 1 tablet (5 mg total) by mouth 2 (two) times daily.    ASPIRIN 325 MG TABLET    Take 650 mg by mouth once daily.    DILTIAZEM (DILACOR XR) 120 MG CDCR    Take 1 capsule (120 mg total) by mouth once daily. for 1 dose    FUROSEMIDE (LASIX) 20 MG TABLET    Take 1 tablet (20 mg total) by mouth once daily. for 1 dose    LOSARTAN (COZAAR) 25 MG TABLET    Take 1 tablet (25 mg total) by mouth once daily.    METOPROLOL TARTRATE (LOPRESSOR) 50 MG TABLET    Take 1 tablet (50 mg total) by mouth 2 (two) times daily.             Assessment:       1. Atrial fibrillation with RVR    2. Other insomnia    3. Abnormal finding of blood chemistry, unspecified     4. Abnormal glucose         Plan:     Problem  List Items Addressed This Visit        Cardiac/Vascular    Atrial fibrillation with RVR - Primary    Relevant Medications    traZODone (DESYREL) 50 MG tablet    Other Relevant Orders    IN OFFICE EKG 12-LEAD (to [facility])    Ambulatory referral/consult to Family Practice    CBC Without Differential    Comprehensive Metabolic Panel    Lipid Panel    Hemoglobin A1C      Other Visit Diagnoses     Other insomnia        Relevant Medications    traZODone (DESYREL) 50 MG tablet    Other Relevant Orders    Ambulatory referral/consult to Family Practice    Abnormal finding of blood chemistry, unspecified         Relevant Orders    Lipid Panel    Abnormal glucose          Hypertension    Her valve signs are currently stable she feels at her baseline has no complaints..  Her only complaint that she can talk about today is that she cannot sleep at night and is tired all day.  She gets up and tries to do some more work on the computer but cannot go back to sleep,  She has tried melatonin and Benadryl does not work.    Will prescribe some trazodone for her and follow-up with Internal Medicine.  Continue current medications for heart.  She has had 2 COVID shots but does not want any flu shots.  She has not had any repeat blood work and she can obtain this from her family physician.  Continue current medications and follow up in 6 months    Follow up in about 6 months (around 8/14/2022).    The patients questions were answered, they verbalized understanding, and agreed with the treatment plan.     ROBERTO BAILEY MD  [facility]

## 2022-02-15 ENCOUNTER — TELEPHONE (OUTPATIENT)
Dept: CARDIOLOGY | Facility: CLINIC | Age: 77
End: 2022-02-15
Payer: MEDICARE

## 2022-02-15 NOTE — TELEPHONE ENCOUNTER
----- Message from Delia Grant sent at 2/15/2022 10:59 AM CST -----  Contact: pt  Type:  RX Refill Request that medication be resent to the this pharm not [facility]    Who Called:  the patient  Refill or New Rx:  RX  RX Name and Strength: pt was told he would be LUNESTA AND NOT THIS BELOW, PT WOULD LIKE A CALL BACK BEFORE SENT TO DISCUSS     traZODone (DESYREL) 50 MG tablet 30 tablet    Sig - Route: Take 1 tablet (50 mg total) by mouth nightly as needed for Insomnia. - Oral      How is the patient currently taking it? (ex. 1XDay):  as order  Is this a 30 day or 90 day RX:  as order  Preferred Pharmacy with phone number:    [id number] - [place], [place] - [address]  [address] [place] [address]  Phone: [phone] Fax: [phone]      Local or Mail Order:  local  Ordering Provider:  lorenza Ramirez Call Back Number:  [phone]  Additional Information:

## 2022-03-18 ENCOUNTER — TELEPHONE (OUTPATIENT)
Dept: CARDIOLOGY | Facility: CLINIC | Age: 77
End: 2022-03-18
Payer: MEDICARE

## 2022-03-18 NOTE — TELEPHONE ENCOUNTER
----- Message from Delia Grant sent at 3/18/2022  3:28 PM CDT -----  Contact: pt  Type:  Sooner Apoointment Request    Caller is requesting a sooner appointment.  Caller declined first available appointment listed below.  Caller will not accept being placed on the waitlist and is requesting a message be sent to doctor.    Name of Caller:  the patient  When is the first available appointment?  non  Symptoms:  feet and legs swelling  Best Call Back Number: [phone]    Additional Information:

## 2022-03-18 NOTE — TELEPHONE ENCOUNTER
----- Message from Delia Grant sent at 3/18/2022  3:28 PM CDT -----  Contact: pt  Type:  Sooner Apoointment Request    Caller is requesting a sooner appointment.  Caller declined first available appointment listed below.  Caller will not accept being placed on the waitlist and is requesting a message be sent to doctor.    Name of Caller:  the patient  When is the first available appointment?  non  Symptoms:  feet and legs swelling  Best Call Back Number: [phone]    Additional Information:

## 2022-03-21 ENCOUNTER — OFFICE VISIT (OUTPATIENT)
Dept: CARDIOLOGY | Facility: CLINIC | Age: 77
End: 2022-03-21
Payer: MEDICARE

## 2022-03-21 VITALS
RESPIRATION RATE: 16 BRPM | SYSTOLIC BLOOD PRESSURE: 124 MMHG | HEIGHT: 62 IN | HEART RATE: 89 BPM | DIASTOLIC BLOOD PRESSURE: 72 MMHG | OXYGEN SATURATION: 98 % | WEIGHT: 160 LBS | BODY MASS INDEX: 29.44 KG/M2

## 2022-03-21 DIAGNOSIS — G47.09 OTHER INSOMNIA: ICD-10-CM

## 2022-03-21 DIAGNOSIS — R79.9 ABNORMAL FINDING OF BLOOD CHEMISTRY, UNSPECIFIED: ICD-10-CM

## 2022-03-21 DIAGNOSIS — I10 HTN, GOAL BELOW 130/80: ICD-10-CM

## 2022-03-21 DIAGNOSIS — I48.91 ATRIAL FIBRILLATION WITH RVR: ICD-10-CM

## 2022-03-21 DIAGNOSIS — I27.20 HYPERTENSIVE PULMONARY VASCULAR DISEASE: ICD-10-CM

## 2022-03-21 DIAGNOSIS — R60.0 LOWER EXTREMITY EDEMA: ICD-10-CM

## 2022-03-21 DIAGNOSIS — I50.41 ACUTE COMBINED SYSTOLIC AND DIASTOLIC CONGESTIVE HEART FAILURE: Primary | ICD-10-CM

## 2022-03-21 DIAGNOSIS — I47.19 AVNRT (AV NODAL RE-ENTRY TACHYCARDIA): ICD-10-CM

## 2022-03-21 DIAGNOSIS — N18.31 STAGE 3A CHRONIC KIDNEY DISEASE: ICD-10-CM

## 2022-03-21 PROCEDURE — 3074F PR MOST RECENT SYSTOLIC BLOOD PRESSURE < 130 MM HG: ICD-10-PCS | Mod: CPTII,S$GLB,, | Performed by: GENERAL PRACTICE

## 2022-03-21 PROCEDURE — 99213 PR OFFICE/OUTPT VISIT, EST, LEVL III, 20-29 MIN: ICD-10-PCS | Mod: S$GLB,,, | Performed by: GENERAL PRACTICE

## 2022-03-21 PROCEDURE — 1101F PT FALLS ASSESS-DOCD LE1/YR: CPT | Mod: CPTII,S$GLB,, | Performed by: GENERAL PRACTICE

## 2022-03-21 PROCEDURE — 3074F SYST BP LT 130 MM HG: CPT | Mod: CPTII,S$GLB,, | Performed by: GENERAL PRACTICE

## 2022-03-21 PROCEDURE — 3078F PR MOST RECENT DIASTOLIC BLOOD PRESSURE < 80 MM HG: ICD-10-PCS | Mod: CPTII,S$GLB,, | Performed by: GENERAL PRACTICE

## 2022-03-21 PROCEDURE — 1159F MED LIST DOCD IN RCRD: CPT | Mod: CPTII,S$GLB,, | Performed by: GENERAL PRACTICE

## 2022-03-21 PROCEDURE — 1126F AMNT PAIN NOTED NONE PRSNT: CPT | Mod: CPTII,S$GLB,, | Performed by: GENERAL PRACTICE

## 2022-03-21 PROCEDURE — 3288F PR FALLS RISK ASSESSMENT DOCUMENTED: ICD-10-PCS | Mod: CPTII,S$GLB,, | Performed by: GENERAL PRACTICE

## 2022-03-21 PROCEDURE — 1160F PR REVIEW ALL MEDS BY PRESCRIBER/CLIN PHARMACIST DOCUMENTED: ICD-10-PCS | Mod: CPTII,S$GLB,, | Performed by: GENERAL PRACTICE

## 2022-03-21 PROCEDURE — 1101F PR PT FALLS ASSESS DOC 0-1 FALLS W/OUT INJ PAST YR: ICD-10-PCS | Mod: CPTII,S$GLB,, | Performed by: GENERAL PRACTICE

## 2022-03-21 PROCEDURE — 1160F RVW MEDS BY RX/DR IN RCRD: CPT | Mod: CPTII,S$GLB,, | Performed by: GENERAL PRACTICE

## 2022-03-21 PROCEDURE — 99213 OFFICE O/P EST LOW 20 MIN: CPT | Mod: S$GLB,,, | Performed by: GENERAL PRACTICE

## 2022-03-21 PROCEDURE — 1126F PR PAIN SEVERITY QUANTIFIED, NO PAIN PRESENT: ICD-10-PCS | Mod: CPTII,S$GLB,, | Performed by: GENERAL PRACTICE

## 2022-03-21 PROCEDURE — 1159F PR MEDICATION LIST DOCUMENTED IN MEDICAL RECORD: ICD-10-PCS | Mod: CPTII,S$GLB,, | Performed by: GENERAL PRACTICE

## 2022-03-21 PROCEDURE — 3288F FALL RISK ASSESSMENT DOCD: CPT | Mod: CPTII,S$GLB,, | Performed by: GENERAL PRACTICE

## 2022-03-21 PROCEDURE — 3078F DIAST BP <80 MM HG: CPT | Mod: CPTII,S$GLB,, | Performed by: GENERAL PRACTICE

## 2022-03-21 RX ORDER — FUROSEMIDE 20 MG/1
40 TABLET ORAL 2 TIMES DAILY
Qty: 40 TABLET | Refills: 3 | Status: SHIPPED | OUTPATIENT
Start: 2022-03-21 | End: 2022-03-26

## 2022-03-21 RX ORDER — SPIRONOLACTONE 25 MG/1
25 TABLET ORAL DAILY
Qty: 30 TABLET | Refills: 11 | Status: SHIPPED | OUTPATIENT
Start: 2022-03-21

## 2022-03-21 NOTE — PROGRESS NOTES
Subjective:    Patient ID:  Linnea Turner is a 76 y.o. female who presents for follow-up of   Chief Complaint   Patient presents with    Edema       HPI:  She has a previous history of AV are and T with ablation by Dr. Sweeney 5 years ago, hypertension, bladder cancer which is treated with local treatment and currently in remission, and psoriatic arthritis.  She was lost to follow-up in the past when she presented to [facility] with recalcitrant AVR and T and required an ablation.  The ablation was successful and she followed with Dr. Sweeney for awhile to the COVID pandemic.  After this she was afraid to come to the hospital physician's office so she did not have her diltiazem and losartan refilled.  She does take to aspirin 325 mg daily for psoriatic arthritis in her head shoulder and knees.  Other anti-inflammatories upset her stomach  .  Approximately 5 weeks ago she started to have irregular heartbeat and swelling in her lower extremities and feet she felt like her heart was beating erratically.  She does not have any chest pain shortness of breath.  No syncope.  She sleeps less than 5 hours a day, may have sleep apnea.  She does not wake up short of breath at night.      Hospital Course:   Patient admitted with A Fib RVR and associated acute heart failure  Pt recently established care with Cardiologist(Dr Haney) as an Outpatient   She picked NOAC from pharmacy but not beta blockers which lead to this admission   Upon this admission pt was started on iv lasix/iv cardizem gtt  Plan was to have Cardioversion during this admission   Pt unfortunately left AMA   Ray Chest AP Portable  Order: [id number]  Status:  Final result   Visible to patient:  Yes (not seen) Next appt:  None   0 Result Notes  Details     Reading Physician Reading Date Result Priority   Joseph Quijano MD  [phone] 10/12/2021     Narrative & Impression  HISTORY: Atrial fibrillation     FINDINGS: Portable chest radiograph at 1246 hours  compared to 10/07/2021 shows the cardiac silhouette is enlarged, with the pulmonary vasculature are within normal limits. There are aortic vascular calcifications.     The lungs are normally expanded, with no consolidation or large pleural effusion. There is mild diffuse interstitial prominence, with no pneumothorax. No acute fractures or destructive osseous lesions.     IMPRESSION: Cardiomegaly, with mild diffuse interstitial prominence, potentially subsegmental atelectasis and/or minimal interstitial pulmonary edema/hypervolemia               She is here for follow-up of echocardiogram.  She went to the hospital 10 12 for Holter and echo ended up in mid with atrial fibrillation rapid response as above.  Her echo results are listed and EF of 50% as below.  She is currently taking her Lopressor she feels basically at her baseline.  Denies shortness of breath PND orthopnea or chest pain.     3/21/22  She is here for follow-up because of swelling in her lower extremity for 2 weeks.  She does not have any shortness of breath but she does have dry skin and itching.  She has chronic atrial fibrillation and EF of 50% with no change in her heart rate or chest pain.  She is currently taking Lasix 20 mg a day.          Review of patient's allergies indicates:  No Known Allergies    Past Medical History:   Diagnosis Date    Atrial fibrillation     Psoriatic arthritis      Past Surgical History:   Procedure Laterality Date    NO PAST SURGERIES       Social History     Tobacco Use    Smoking status: Former Smoker    Smokeless tobacco: Never Used   Substance Use Topics    Alcohol use: Yes    Drug use: Never     Family History   Problem Relation Age of Onset    Hypertension Mother         Review of Systems:   Constitution: Negative for diaphoresis and fever.   HEENT: Negative for nosebleeds.    Cardiovascular: Negative for chest pain       No dyspnea on exertion       No leg swelling        No palpitations  Respiratory:  Negative for shortness of breath and wheezing.    Hematologic/Lymphatic: Negative for bleeding problem. Does not bruise/bleed easily.   Skin: Negative for color change and rash.   Musculoskeletal: Negative for falls and myalgias.   Gastrointestinal: Negative for hematemesis and hematochezia.   Genitourinary: Negative for hematuria.   Neurological: Negative for dizziness and light-headedness.   Psychiatric/Behavioral: Negative for altered mental status and memory loss.          Objective:        Vitals:    03/21/22 1432   BP: 124/72   Pulse: 89   Resp: 16       Lab Results   Component Value Date    WBC 6.95 10/13/2021    HGB 13.3 10/13/2021    HCT 42.3 10/13/2021     10/13/2021    ALT 24 10/13/2021    AST 26 10/13/2021     10/13/2021    K 3.7 10/13/2021     10/13/2021    CREATININE 1.0 10/13/2021    BUN 17 10/13/2021    CO2 24 10/13/2021    INR 1.5 10/12/2021        ECHOCARDIOGRAM RESULTS  Results for orders placed during the hospital encounter of 10/12/21    Echo Saline Bubble? No    Interpretation Summary  · The left ventricle is normal in size with mild concentric hypertrophy and low normal systolic function.  · The estimated PA systolic pressure is 39 mmHg.  · Normal right ventricular size with normal right ventricular systolic function.  · Normal central venous pressure (3 mmHg).  · Moderate left atrial enlargement.  · The estimated ejection fraction is 50%.  · Mild-to-moderate mitral regurgitation.  · Mild right atrial enlargement.  · Atrial fibrillation observed.  · Mild to moderate tricuspid regurgitation.  · There is mild pulmonary hypertension.        CURRENT/PREVIOUS VISIT EKG  Results for orders placed or performed in visit on 02/14/22   IN OFFICE EKG 12-LEAD (to [facility])    Collection Time: 02/14/22 11:21 AM    Narrative    Test Reason : I48.91,    Vent. Rate : 087 BPM     Atrial Rate : 258 BPM     P-R Int : 000 ms          QRS Dur : 092 ms      QT Int : 388 ms       P-R-T Axes : 000 183  258 degrees     QTc Int : 466 ms     Suspect arm lead reversal, interpretation assumes no reversal  Atrial fibrillation  Septal infarct ,age undetermined  Lateral infarct (cited on or before 04-OCT-2021)  Abnormal ECG  When compared with ECG of 18-OCT-2021 13:34,  No significant change was found  Confirmed by Lyndon POSADAS, John ORTIZ ([id number]) on 3/2/2022 9:13:37 PM    Referred By:  LYNDON           Confirmed By:John Haney MD     No valid procedures specified.   No results found for this or any previous visit.      Physical Exam:  CONSTITUTIONAL: No fever, no chills  HEENT: Normocephalic, atraumatic,pupils reactive to light                 NECK:  No JVD no carotid bruit  CVS: S1S2+, RRR, no murmurs,   LUNGS: Clear? CRACKLE  ABDOMEN: Soft, NT, BS+  EXTREMITIES: No cyanosis, 3 PLUS TO KNEES edema  : No barton catheter  NEURO: AAO X 3  PSY: Normal affect      Medication List with Changes/Refills   Current Medications    APIXABAN (ELIQUIS) 5 MG TAB    Take 1 tablet (5 mg total) by mouth 2 (two) times daily.    ASPIRIN 325 MG TABLET    Take 650 mg by mouth once daily.    DILTIAZEM (DILACOR XR) 120 MG CDCR    Take 1 capsule (120 mg total) by mouth once daily. for 1 dose    LOSARTAN (COZAAR) 25 MG TABLET    Take 1 tablet (25 mg total) by mouth once daily.    METOPROLOL TARTRATE (LOPRESSOR) 50 MG TABLET    Take 1 tablet (50 mg total) by mouth 2 (two) times daily.    TRAZODONE (DESYREL) 50 MG TABLET    Take 1 tablet (50 mg total) by mouth nightly as needed for Insomnia.   Changed and/or Refilled Medications    Modified Medication Previous Medication    FUROSEMIDE (LASIX) 20 MG TABLET furosemide (LASIX) 20 MG tablet       Take 2 tablets (40 mg total) by mouth 2 (two) times a day. for 5 days    Take 1 tablet (20 mg total) by mouth once daily. for 1 dose             Assessment:       1. Acute combined systolic and diastolic congestive heart failure    2. Atrial fibrillation with RVR    3. AVNRT (AV ruth re-entry tachycardia)     4. Abnormal finding of blood chemistry, unspecified     5. HTN, goal below 130/80    6. Stage 3a chronic kidney disease    7. Hypertensive pulmonary vascular disease    8. Other insomnia    9. Lower extremity edema         Plan:     Problem List Items Addressed This Visit        Cardiac/Vascular    Atrial fibrillation with RVR    Acute CHF - Primary    Relevant Orders    X-Ray Chest PA And Lateral      Other Visit Diagnoses     AVNRT (AV ruth re-entry tachycardia)        Abnormal finding of blood chemistry, unspecified         HTN, goal below 130/80        Stage 3a chronic kidney disease        Hypertensive pulmonary vascular disease        Other insomnia        Lower extremity edema            SHE PRESENTS WITH LOWER EXTREMITY EDEMA BILATERAL.  She has chronic atrial fibrillation and preserved ejection fraction mild pulmonary hypertension.  Her heart rate is controlled.  I am going to double the Lasix for 5 days and then increase it to 40 mg and Aldactone for possible heart failure preserved ejection fraction check a chest x-ray.  Consider add Jardiance, continue ACE-inhibitor, Cozaar, and the metoprolol for rate control.  She denies sleep apnea but does have insomnia but denies shortness of breath or PND orthopnea  Follow up in about 4 weeks (around 4/18/2022).    The patients questions were answered, they verbalized understanding, and agreed with the treatment plan.     ROBERTO BAILEY MD  [facility]

## 2022-04-08 ENCOUNTER — TELEPHONE (OUTPATIENT)
Dept: CARDIOLOGY | Facility: CLINIC | Age: 77
End: 2022-04-08
Payer: MEDICARE

## 2022-04-08 NOTE — TELEPHONE ENCOUNTER
Pt was offered [facility] for an appt pt refused. Pt said she needs a dr local in [place]. Pt was informed that we do not know exactly when we will be returning to [facility]. Pt said she will look for another provider closer for her.

## 2022-04-08 NOTE — TELEPHONE ENCOUNTER
----- Message from Jose D Albert sent at 4/8/2022  9:56 AM CDT -----  Contact: pt  Needing to make an appt this Monday please call back to make an appt for pt     [phone]

## 2022-05-16 ENCOUNTER — HOSPITAL ENCOUNTER (OUTPATIENT)
Dept: TELEMEDICINE | Facility: HOSPITAL | Age: 77
Discharge: HOME | End: 2022-05-16
Payer: MEDICARE

## 2022-05-16 DIAGNOSIS — R55 TRANSIENT LOSS OF CONSCIOUSNESS: ICD-10-CM

## 2022-05-16 PROCEDURE — G0426 PR INPT TELEHEALTH CONSULT 50M: ICD-10-PCS | Mod: 95,,, | Performed by: PSYCHIATRY & NEUROLOGY

## 2022-05-16 PROCEDURE — G0426 INPT/ED TELECONSULT50: HCPCS | Mod: 95,,, | Performed by: PSYCHIATRY & NEUROLOGY

## 2022-05-16 NOTE — ASSESSMENT & PLAN NOTE
Transient LOC w/ reported left hemiparesis post-event.  Patient without any recollection of the time period between vision exam and then in ED.  Currently nonfocal w/o signs of stroke.  Findings most consistent with seizure vs. Syncope and much less likely to be focal neurovascular in nature.

## 2022-05-16 NOTE — CONSULTS
[facility]  Vascular Neurology  Comprehensive Stroke Center  TeleVascular Neurology Acute Consultation Note      Consults    Consulting Provider: BRETT MARTIN  Current Providers  No providers found    Patient Location: [facility] - [facility] Emergency Department  Spoke hospital nurse at bedside with patient assisting consultant.     Patient information was obtained from patient.         Assessment/Plan:       Diagnoses:   Transient loss of consciousness  Transient LOC w/ reported left hemiparesis post-event.  Patient without any recollection of the time period between vision exam and then in ED.  Currently nonfocal w/o signs of stroke.  Findings most consistent with seizure vs. Syncope and much less likely to be focal neurovascular in nature.        STROKE DOCUMENTATION     Acute Stroke Times:   Acute Stroke Times   Stroke Team Called Time: 1042  Stroke Team Arrival Time: 1049  CT Interpretation Time: 1049  Alteplase Recommended: No    NIH Scale:  1a. Level of Consciousness: 0-->Alert, keenly responsive  1b. LOC Questions: 0-->Answers both questions correctly  1c. LOC Commands: 0-->Performs both tasks correctly  2. Best Gaze: 0-->Normal  3. Visual: 0-->No visual loss  4. Facial Palsy: 0-->Normal symmetrical movements  5a. Motor Arm, Left: 0-->No drift, limb holds 90 (or 45) degrees for full 10 secs  5b. Motor Arm, Right: 0-->No drift, limb holds 90 (or 45) degrees for full 10 secs  6a. Motor Leg, Left: 0-->No drift, leg holds 30 degree position for full 5 secs  6b. Motor Leg, Right: 0-->No drift, leg holds 30 degree position for full 5 secs  7. Limb Ataxia: 0-->Absent  8. Sensory: 0-->Normal, no sensory loss  9. Best Language: 0-->No aphasia, normal  10. Dysarthria: 0-->Normal  11. Extinction and Inattention (formerly Neglect): 0-->No abnormality  Total (NIH Stroke Scale): 0     Modified [place] Coma Scale:    ABCD2 Score:     [id number] Score:   HAS -BLED Score:   ICH Score:   Hunt & Ahumada Classification:       There were no vitals taken for this visit.  Alteplase Eligible?: No  Alteplase Recommendation: Alteplase not recommended due to Patient back to neurological baseline  Possible Interventional Revascularization Candidate? No; at this time symptoms not suggestive of large vessel occlusion    Disposition Recommendation: pending further studies    Subjective:     History of Present Illness:  76F was getting an eye exam and sneezed and then went unresponsive. EMS arrived and found left hemiplegia. Patient does not recall the event and has laceration on tongue. No focal findings on exam currently. Back to baseline.        Woke up with symptoms?: no    Recent bleeding noted: no  Does the patient take any Blood Thinners? no  Medications: No relevant medications      Past Medical History:   Past Medical History:   Diagnosis Date    Atrial fibrillation     Psoriatic arthritis        Past Surgical History: no major surgeries within the last 2 weeks    Family History: no relevant history    Social History: no smoking, no drinking, no drugs    Allergies: No Known Allergies No relevant allergies    Review of Systems   Constitutional: Negative for appetite change.   HENT: Negative for congestion.    Eyes: Negative for discharge.   Respiratory: Negative for shortness of breath.    Cardiovascular: Negative for chest pain.   Gastrointestinal: Negative for abdominal pain.   Endocrine: Negative for cold intolerance.   Genitourinary: Negative for difficulty urinating.   Musculoskeletal: Negative for joint swelling.   Skin: Negative for color change.     Objective:   Vitals: HR 60, /67, RR 18, o2 100     CT READ: No. Not available.    Physical Exam  Constitutional:       General: She is not in acute distress.  HENT:      Head: Normocephalic.      Right Ear: External ear normal.      Left Ear: External ear normal.   Eyes:       Conjunctiva/sclera: Conjunctivae normal.   Pulmonary:      Effort: Pulmonary effort is normal.      Breath sounds: No stridor.   Abdominal:      Tenderness: There is no abdominal tenderness.   Musculoskeletal:      Cervical back: Normal range of motion.   Skin:     General: Skin is dry.      Findings: No rash.               Recommended the emergency room physician to have a brief discussion with the patient and/or family if available regarding the  risks and benefits of treatment, and to briefly document the occurrence of that discussion in his clinical encounter note.     The attending portion of this evaluation, treatment, and documentation was performed per Angel Benitez MD via audiovisual.      In your opinion, this was a: Tier 1 N/A    Consult End Time: 11:08 AM     Angel Benitez MD  [facility] Stroke Center  Vascular Neurology   [facility]

## 2022-05-16 NOTE — HPI
76F was getting an eye exam and sneezed and then went unresponsive. EMS arrived and found left hemiplegia. Patient does not recall the event and has laceration on tongue. No focal findings on exam currently. Back to baseline.

## 2022-05-16 NOTE — SUBJECTIVE & OBJECTIVE
Woke up with symptoms?: no    Recent bleeding noted: no  Does the patient take any Blood Thinners? no  Medications: No relevant medications      Past Medical History:   Past Medical History:   Diagnosis Date    Atrial fibrillation     Psoriatic arthritis        Past Surgical History: no major surgeries within the last 2 weeks    Family History: no relevant history    Social History: no smoking, no drinking, no drugs    Allergies: No Known Allergies No relevant allergies    Review of Systems   Constitutional: Negative for appetite change.   HENT: Negative for congestion.    Eyes: Negative for discharge.   Respiratory: Negative for shortness of breath.    Cardiovascular: Negative for chest pain.   Gastrointestinal: Negative for abdominal pain.   Endocrine: Negative for cold intolerance.   Genitourinary: Negative for difficulty urinating.   Musculoskeletal: Negative for joint swelling.   Skin: Negative for color change.     Objective:   Vitals: HR 60, /67, RR 18, o2 100     CT READ: No. Not available.    Physical Exam  Constitutional:       General: She is not in acute distress.  HENT:      Head: Normocephalic.      Right Ear: External ear normal.      Left Ear: External ear normal.   Eyes:      Conjunctiva/sclera: Conjunctivae normal.   Pulmonary:      Effort: Pulmonary effort is normal.      Breath sounds: No stridor.   Abdominal:      Tenderness: There is no abdominal tenderness.   Musculoskeletal:      Cervical back: Normal range of motion.   Skin:     General: Skin is dry.      Findings: No rash.